# Patient Record
Sex: FEMALE | Race: BLACK OR AFRICAN AMERICAN | NOT HISPANIC OR LATINO | URBAN - METROPOLITAN AREA
[De-identification: names, ages, dates, MRNs, and addresses within clinical notes are randomized per-mention and may not be internally consistent; named-entity substitution may affect disease eponyms.]

---

## 2017-02-02 ENCOUNTER — INPATIENT (INPATIENT)
Facility: HOSPITAL | Age: 72
LOS: 3 days | Discharge: HOME | End: 2017-02-06
Attending: INTERNAL MEDICINE | Admitting: INTERNAL MEDICINE
Payer: MEDICARE

## 2017-02-02 VITALS
OXYGEN SATURATION: 99 % | DIASTOLIC BLOOD PRESSURE: 73 MMHG | SYSTOLIC BLOOD PRESSURE: 126 MMHG | TEMPERATURE: 98 F | HEART RATE: 88 BPM

## 2017-02-02 DIAGNOSIS — T14.8 OTHER INJURY OF UNSPECIFIED BODY REGION: ICD-10-CM

## 2017-02-02 DIAGNOSIS — I10 ESSENTIAL (PRIMARY) HYPERTENSION: ICD-10-CM

## 2017-02-02 DIAGNOSIS — E11.9 TYPE 2 DIABETES MELLITUS WITHOUT COMPLICATIONS: ICD-10-CM

## 2017-02-02 DIAGNOSIS — Z41.8 ENCOUNTER FOR OTHER PROCEDURES FOR PURPOSES OTHER THAN REMEDYING HEALTH STATE: ICD-10-CM

## 2017-02-02 DIAGNOSIS — E03.9 HYPOTHYROIDISM, UNSPECIFIED: ICD-10-CM

## 2017-02-02 DIAGNOSIS — R55 SYNCOPE AND COLLAPSE: ICD-10-CM

## 2017-02-02 DIAGNOSIS — Z90.710 ACQUIRED ABSENCE OF BOTH CERVIX AND UTERUS: Chronic | ICD-10-CM

## 2017-02-02 LAB
ALBUMIN SERPL ELPH-MCNC: 4.3 G/DL — SIGNIFICANT CHANGE UP (ref 3.3–5)
ALP SERPL-CCNC: 88 U/L — SIGNIFICANT CHANGE UP (ref 40–120)
ALT FLD-CCNC: 36 U/L — HIGH (ref 4–33)
AST SERPL-CCNC: 60 U/L — HIGH (ref 4–32)
BASOPHILS # BLD AUTO: 0.04 K/UL — SIGNIFICANT CHANGE UP (ref 0–0.2)
BASOPHILS NFR BLD AUTO: 0.4 % — SIGNIFICANT CHANGE UP (ref 0–2)
BILIRUB SERPL-MCNC: 0.6 MG/DL — SIGNIFICANT CHANGE UP (ref 0.2–1.2)
BUN SERPL-MCNC: 11 MG/DL — SIGNIFICANT CHANGE UP (ref 7–23)
BUN SERPL-MCNC: 9 MG/DL — SIGNIFICANT CHANGE UP (ref 7–23)
CALCIUM SERPL-MCNC: 9.2 MG/DL — SIGNIFICANT CHANGE UP (ref 8.4–10.5)
CALCIUM SERPL-MCNC: 9.5 MG/DL — SIGNIFICANT CHANGE UP (ref 8.4–10.5)
CHLORIDE SERPL-SCNC: 100 MMOL/L — SIGNIFICANT CHANGE UP (ref 98–107)
CHLORIDE SERPL-SCNC: 96 MMOL/L — LOW (ref 98–107)
CK MB BLD-MCNC: 2.05 NG/ML — SIGNIFICANT CHANGE UP (ref 1–4.7)
CK SERPL-CCNC: 200 U/L — HIGH (ref 25–170)
CO2 SERPL-SCNC: 21 MMOL/L — LOW (ref 22–31)
CO2 SERPL-SCNC: 22 MMOL/L — SIGNIFICANT CHANGE UP (ref 22–31)
CREAT SERPL-MCNC: 0.88 MG/DL — SIGNIFICANT CHANGE UP (ref 0.5–1.3)
CREAT SERPL-MCNC: 1.08 MG/DL — SIGNIFICANT CHANGE UP (ref 0.5–1.3)
EOSINOPHIL # BLD AUTO: 0.14 K/UL — SIGNIFICANT CHANGE UP (ref 0–0.5)
EOSINOPHIL NFR BLD AUTO: 1.5 % — SIGNIFICANT CHANGE UP (ref 0–6)
GLUCOSE SERPL-MCNC: 118 MG/DL — HIGH (ref 70–99)
GLUCOSE SERPL-MCNC: 129 MG/DL — HIGH (ref 70–99)
HCT VFR BLD CALC: 43.9 % — SIGNIFICANT CHANGE UP (ref 34.5–45)
HGB BLD-MCNC: 15 G/DL — SIGNIFICANT CHANGE UP (ref 11.5–15.5)
IMM GRANULOCYTES NFR BLD AUTO: 0.4 % — SIGNIFICANT CHANGE UP (ref 0–1.5)
LYMPHOCYTES # BLD AUTO: 2.09 K/UL — SIGNIFICANT CHANGE UP (ref 1–3.3)
LYMPHOCYTES # BLD AUTO: 22.7 % — SIGNIFICANT CHANGE UP (ref 13–44)
MCHC RBC-ENTMCNC: 29.5 PG — SIGNIFICANT CHANGE UP (ref 27–34)
MCHC RBC-ENTMCNC: 34.2 % — SIGNIFICANT CHANGE UP (ref 32–36)
MCV RBC AUTO: 86.4 FL — SIGNIFICANT CHANGE UP (ref 80–100)
MONOCYTES # BLD AUTO: 0.77 K/UL — SIGNIFICANT CHANGE UP (ref 0–0.9)
MONOCYTES NFR BLD AUTO: 8.4 % — SIGNIFICANT CHANGE UP (ref 2–14)
NEUTROPHILS # BLD AUTO: 6.13 K/UL — SIGNIFICANT CHANGE UP (ref 1.8–7.4)
NEUTROPHILS NFR BLD AUTO: 66.6 % — SIGNIFICANT CHANGE UP (ref 43–77)
PLATELET # BLD AUTO: 178 K/UL — SIGNIFICANT CHANGE UP (ref 150–400)
PMV BLD: 11.1 FL — SIGNIFICANT CHANGE UP (ref 7–13)
POTASSIUM SERPL-MCNC: 3.2 MMOL/L — LOW (ref 3.5–5.3)
POTASSIUM SERPL-MCNC: SIGNIFICANT CHANGE UP MMOL/L (ref 3.5–5.3)
POTASSIUM SERPL-SCNC: 3.2 MMOL/L — LOW (ref 3.5–5.3)
POTASSIUM SERPL-SCNC: SIGNIFICANT CHANGE UP MMOL/L (ref 3.5–5.3)
PROT SERPL-MCNC: 8.7 G/DL — HIGH (ref 6–8.3)
RBC # BLD: 5.08 M/UL — SIGNIFICANT CHANGE UP (ref 3.8–5.2)
RBC # FLD: 12.6 % — SIGNIFICANT CHANGE UP (ref 10.3–14.5)
SODIUM SERPL-SCNC: 133 MMOL/L — LOW (ref 135–145)
SODIUM SERPL-SCNC: 138 MMOL/L — SIGNIFICANT CHANGE UP (ref 135–145)
TROPONIN T SERPL-MCNC: < 0.06 NG/ML — SIGNIFICANT CHANGE UP (ref 0–0.06)
WBC # BLD: 9.21 K/UL — SIGNIFICANT CHANGE UP (ref 3.8–10.5)
WBC # FLD AUTO: 9.21 K/UL — SIGNIFICANT CHANGE UP (ref 3.8–10.5)

## 2017-02-02 PROCEDURE — 71010: CPT | Mod: 26

## 2017-02-02 PROCEDURE — 73610 X-RAY EXAM OF ANKLE: CPT | Mod: 26,50

## 2017-02-02 PROCEDURE — 73564 X-RAY EXAM KNEE 4 OR MORE: CPT | Mod: 26,RT

## 2017-02-02 RX ORDER — LEVOTHYROXINE SODIUM 125 MCG
75 TABLET ORAL DAILY
Qty: 0 | Refills: 0 | Status: DISCONTINUED | OUTPATIENT
Start: 2017-02-02 | End: 2017-02-06

## 2017-02-02 RX ORDER — AMLODIPINE BESYLATE 2.5 MG/1
5 TABLET ORAL DAILY
Qty: 0 | Refills: 0 | Status: DISCONTINUED | OUTPATIENT
Start: 2017-02-02 | End: 2017-02-06

## 2017-02-02 RX ORDER — ENOXAPARIN SODIUM 100 MG/ML
40 INJECTION SUBCUTANEOUS EVERY 24 HOURS
Qty: 0 | Refills: 0 | Status: DISCONTINUED | OUTPATIENT
Start: 2017-02-02 | End: 2017-02-06

## 2017-02-02 RX ORDER — ATORVASTATIN CALCIUM 80 MG/1
10 TABLET, FILM COATED ORAL AT BEDTIME
Qty: 0 | Refills: 0 | Status: DISCONTINUED | OUTPATIENT
Start: 2017-02-02 | End: 2017-02-02

## 2017-02-02 RX ORDER — LEVOTHYROXINE SODIUM 125 MCG
1 TABLET ORAL
Qty: 0 | Refills: 0 | COMMUNITY

## 2017-02-02 RX ORDER — AMLODIPINE BESYLATE 2.5 MG/1
1 TABLET ORAL
Qty: 0 | Refills: 0 | COMMUNITY

## 2017-02-02 RX ORDER — SODIUM CHLORIDE 9 MG/ML
1000 INJECTION INTRAMUSCULAR; INTRAVENOUS; SUBCUTANEOUS ONCE
Qty: 0 | Refills: 0 | Status: COMPLETED | OUTPATIENT
Start: 2017-02-02 | End: 2017-02-02

## 2017-02-02 RX ORDER — POTASSIUM CHLORIDE 20 MEQ
40 PACKET (EA) ORAL EVERY 4 HOURS
Qty: 0 | Refills: 0 | Status: COMPLETED | OUTPATIENT
Start: 2017-02-02 | End: 2017-02-03

## 2017-02-02 RX ADMIN — ENOXAPARIN SODIUM 40 MILLIGRAM(S): 100 INJECTION SUBCUTANEOUS at 22:15

## 2017-02-02 RX ADMIN — SODIUM CHLORIDE 1000 MILLILITER(S): 9 INJECTION INTRAMUSCULAR; INTRAVENOUS; SUBCUTANEOUS at 16:22

## 2017-02-02 RX ADMIN — Medication 40 MILLIEQUIVALENT(S): at 22:15

## 2017-02-02 NOTE — H&P ADULT. - ATTENDING COMMENTS
70 y/o female with PMHx of DM, HTN, hypothyroidism, pre-diabetes admitted for presyncope and syncope back to back while  she was walking down the street and  she felt hot and weak then fainted onto the sidewalk hitting her face mostly lip to the ground. Patient states she was helped up by bystanders on the street and she fainted another time. She was orthostatic. pt states that she had normal cardiac cath 3-4 years ago after they noticed some EKG changes.  Tele: No event, sinus  IVF  NS X 1L    ECHO  Carotid  Stress test

## 2017-02-02 NOTE — H&P ADULT. - NEGATIVE CARDIOVASCULAR SYMPTOMS
no dyspnea on exertion/no paroxysmal nocturnal dyspnea/no peripheral edema/no chest pain/no orthopnea/no palpitations

## 2017-02-02 NOTE — H&P ADULT. - MUSCULOSKELETAL
details… detailed exam tenderness to palpation of right knee and right ankle with inversion/eversion/normal/ROM intact/no joint swelling

## 2017-02-02 NOTE — ED PROVIDER NOTE - ATTENDING CONTRIBUTION TO CARE
I performed a history and physical exam of the patient and discussed their management with the resident. I reviewed the resident's note and agree with the documented findings and plan of care. My medical decision making and observations are found above.  No focal neuro changes. Trauma to chin from fall.

## 2017-02-02 NOTE — ED PROVIDER NOTE - OBJECTIVE STATEMENT
72 y/o F w/ Hx DM, HTN, HLD pf syncope.  Pt was walking, felt hot, felt like she was going to pass out, fell to ground, helped up, s/p syncope, does not remember falling second time, hit face on ground.  Notes pain to R knee.  Denies CP, SOB, n/v, AP, d/c, cough, or recent illness.  tdap UTD

## 2017-02-02 NOTE — H&P ADULT. - PROBLEM SELECTOR PLAN 1
Admit to telemetry for continuous cardiac monitoring.  Orthostatics   Trend CE  Serial EKGs  TTE to evaluate LVSF  Fall precautions  PT evaluation  CBC, BMP

## 2017-02-02 NOTE — H&P ADULT. - HISTORY OF PRESENT ILLNESS
72 y/o female with PMHx of DM, HTN, hypothyroidism, pre-diabetes p/w syncope. Patient states she was walking down the block today when she felt hot and weak and fainted onto the sidewalk hitting her face to the ground. Patient states she was helped up by people on the street and she fainted another time. Patient states she does not recall fainting the second time. Patient has right knee and ankle pain after the fall. Patient had laceration to lower lip sutured by the ER.  Patient denies any CP, confusion, dizziness, previous episode of syncope, SOB, HA, N/V/D,     Patient stated in 2012 she was sent from cardiologist office for EKG changes and has angiogram that was normal. Patient does not have a cardiologist. EKG showed 72 y/o female with PMHx of DM, HTN, hypothyroidism, pre-diabetes p/w syncope. Patient states she was walking down the block today when she felt hot and weak and fainted onto the sidewalk hitting her face to the ground. Patient states she was helped up by people on the street and she fainted another time. Patient states she does not recall fainting the second time. Patient has right knee and ankle pain after the fall. Patient had laceration to lower lip sutured by the ER.  Patient denies any CP, confusion, dizziness, previous episode of syncope, SOB, HA, N/V/D, abdominal pain. Patient denies cardiac workup in past and does not have a cardiologist.     Patient stated in 2012 she was sent from cardiologist office for EKG changes and has angiogram that was normal. Patient does not have a cardiologist. EKG showed NSR with TWI II, III, aVL, V1-V5. 70 y/o female with PMHx of DM, HTN, hypothyroidism, pre-diabetes p/w syncope. Patient states she was walking down the block today when she felt hot and weak then fainted onto the sidewalk hitting her face to the ground. Patient states she was helped up by bystanders on the street and she fainted another time. Patient states she does not recall fainting the second time. Patient has right knee and ankle pain after the fall. Xray of Knee shows no fracture, dislocation, or joint effusion. At baseline patient ambulates without assistance and is able to perform ADL's independently.     Patient stated in 2012 she was sent from cardiologist office for EKG changes and has angiogram that was normal. Patient does not have a cardiologist. EKG showed NSR with TWI II, III, aVL, V1-V5.   Patient had laceration to lower lip sutured by the ER.  At present patient is asymptomatic, denies any CP, palpitations, SOB, N/V/D, dizziness, weakness, recent illness, PND, lower extremity edema, PND, LEOS, previous episode of syncope. 72 y/o female with PMHx of DM, HTN, hypothyroidism, pre-diabetes p/w syncope. Patient states she was walking down the block today when she felt hot and weak then fainted onto the sidewalk hitting her face to the ground. Patient states she was helped up by bystanders on the street and she fainted another time. Patient states she does not recall fainting the second time. Patient has right knee and ankle pain after the fall. Xray of Knee shows no fracture, dislocation, or joint effusion. At baseline patient ambulates without assistance and is able to perform ADL's independently.     Patient stated in 2012 she was sent from cardiologist office for EKG changes and has angiogram that was normal. Patient does not have a cardiologist. EKG showed NSR with TWI II, III, aVL, V1-V5.   Patient had 4 cm laceration to lower lip sutured by the ER with 3 chromic sutures.  At present patient is asymptomatic, denies any CP, palpitations, SOB, N/V/D, dizziness, weakness, recent illness, PND, lower extremity edema, PND, LEOS, previous episode of syncope.

## 2017-02-02 NOTE — ED ADULT NURSE NOTE - OBJECTIVE STATEMENT
Facilitator: Receive pt in spot 4 s/p syncope/fall after feeling warmed and flushed.  Denies chest pain, sob, dizziness.,  Pt with bruising to lower gum area.  Bleeding controlled/  Pt with R knee pain and mild swelling.  IV placed.  Labs sent.  VS see charting.  Pt sinus melany 57 on cardiac monitoring.  Skin intact

## 2017-02-02 NOTE — H&P ADULT. - ASSESSMENT
72 y/o female with PMHx of DM, HTN, hypothyroidism, pre-diabetes p/w syncope. Admit to telemetry for continuous cardiac monitoring.

## 2017-02-02 NOTE — ED ADULT TRIAGE NOTE - CHIEF COMPLAINT QUOTE
Pt c/o feeling dizzy and fell states got up and fell again. Pt denies any chest pain states hit the left side of her face no bruising  noted.

## 2017-02-02 NOTE — ED PROVIDER NOTE - MEDICAL DECISION MAKING DETAILS
Viktoria: pt with lightheadedness leading to syncope. No obvious cause by hx. Hx of htn no known cardiac dz. Will look for causes.

## 2017-02-02 NOTE — PATIENT PROFILE ADULT. - TEACHING/LEARNING LEARNING PREFERENCES
group instruction/written material/skill demonstration/verbal instruction/pictorial/video/computer/internet/audio/individual instruction

## 2017-02-02 NOTE — H&P ADULT. - RS GEN PE MLT RESP DETAILS PC
no rales/clear to auscultation bilaterally/airway patent/no wheezes/good air movement/no rhonchi/normal/breath sounds equal

## 2017-02-02 NOTE — H&P ADULT. - NEUROLOGICAL DETAILS
responds to pain/alert and oriented x 3/normal strength/cranial nerves intact/responds to verbal commands/sensation intact

## 2017-02-03 LAB
BUN SERPL-MCNC: 10 MG/DL — SIGNIFICANT CHANGE UP (ref 7–23)
CALCIUM SERPL-MCNC: 9.2 MG/DL — SIGNIFICANT CHANGE UP (ref 8.4–10.5)
CHLORIDE SERPL-SCNC: 102 MMOL/L — SIGNIFICANT CHANGE UP (ref 98–107)
CHOLEST SERPL-MCNC: 150 MG/DL — SIGNIFICANT CHANGE UP (ref 120–199)
CK MB BLD-MCNC: 1 — SIGNIFICANT CHANGE UP (ref 0–2.5)
CK MB BLD-MCNC: 1.67 NG/ML — SIGNIFICANT CHANGE UP (ref 1–4.7)
CK MB BLD-MCNC: 2.17 NG/ML — SIGNIFICANT CHANGE UP (ref 1–4.7)
CK SERPL-CCNC: 171 U/L — HIGH (ref 25–170)
CK SERPL-CCNC: 175 U/L — HIGH (ref 25–170)
CO2 SERPL-SCNC: 18 MMOL/L — LOW (ref 22–31)
CREAT SERPL-MCNC: 0.8 MG/DL — SIGNIFICANT CHANGE UP (ref 0.5–1.3)
GLUCOSE SERPL-MCNC: 102 MG/DL — HIGH (ref 70–99)
HBA1C BLD-MCNC: 6.1 % — HIGH (ref 4–5.6)
HCT VFR BLD CALC: 43.3 % — SIGNIFICANT CHANGE UP (ref 34.5–45)
HDLC SERPL-MCNC: 47 MG/DL — SIGNIFICANT CHANGE UP (ref 45–65)
HGB BLD-MCNC: 14.9 G/DL — SIGNIFICANT CHANGE UP (ref 11.5–15.5)
LIPID PNL WITH DIRECT LDL SERPL: 94 MG/DL — SIGNIFICANT CHANGE UP
MAGNESIUM SERPL-MCNC: 2.2 MG/DL — SIGNIFICANT CHANGE UP (ref 1.6–2.6)
MCHC RBC-ENTMCNC: 30.2 PG — SIGNIFICANT CHANGE UP (ref 27–34)
MCHC RBC-ENTMCNC: 34.4 % — SIGNIFICANT CHANGE UP (ref 32–36)
MCV RBC AUTO: 87.7 FL — SIGNIFICANT CHANGE UP (ref 80–100)
PHOSPHATE SERPL-MCNC: 3.5 MG/DL — SIGNIFICANT CHANGE UP (ref 2.5–4.5)
PLATELET # BLD AUTO: 215 K/UL — SIGNIFICANT CHANGE UP (ref 150–400)
PMV BLD: 10.8 FL — SIGNIFICANT CHANGE UP (ref 7–13)
POTASSIUM SERPL-MCNC: 4.4 MMOL/L — SIGNIFICANT CHANGE UP (ref 3.5–5.3)
POTASSIUM SERPL-SCNC: 4.4 MMOL/L — SIGNIFICANT CHANGE UP (ref 3.5–5.3)
RBC # BLD: 4.94 M/UL — SIGNIFICANT CHANGE UP (ref 3.8–5.2)
RBC # FLD: 12.7 % — SIGNIFICANT CHANGE UP (ref 10.3–14.5)
SODIUM SERPL-SCNC: 140 MMOL/L — SIGNIFICANT CHANGE UP (ref 135–145)
TRIGL SERPL-MCNC: 83 MG/DL — SIGNIFICANT CHANGE UP (ref 10–149)
TROPONIN T SERPL-MCNC: < 0.06 NG/ML — SIGNIFICANT CHANGE UP (ref 0–0.06)
TROPONIN T SERPL-MCNC: < 0.06 NG/ML — SIGNIFICANT CHANGE UP (ref 0–0.06)
TSH SERPL-MCNC: 2.22 UIU/ML — SIGNIFICANT CHANGE UP (ref 0.27–4.2)
WBC # BLD: 15.2 K/UL — HIGH (ref 3.8–10.5)
WBC # FLD AUTO: 15.2 K/UL — HIGH (ref 3.8–10.5)

## 2017-02-03 PROCEDURE — 93880 EXTRACRANIAL BILAT STUDY: CPT | Mod: 26

## 2017-02-03 RX ORDER — METOPROLOL TARTRATE 50 MG
100 TABLET ORAL DAILY
Qty: 0 | Refills: 0 | Status: DISCONTINUED | OUTPATIENT
Start: 2017-02-03 | End: 2017-02-06

## 2017-02-03 RX ORDER — SODIUM CHLORIDE 9 MG/ML
1000 INJECTION INTRAMUSCULAR; INTRAVENOUS; SUBCUTANEOUS
Qty: 0 | Refills: 0 | Status: COMPLETED | OUTPATIENT
Start: 2017-02-03 | End: 2017-02-03

## 2017-02-03 RX ADMIN — AMLODIPINE BESYLATE 5 MILLIGRAM(S): 2.5 TABLET ORAL at 05:19

## 2017-02-03 RX ADMIN — SODIUM CHLORIDE 60 MILLILITER(S): 9 INJECTION INTRAMUSCULAR; INTRAVENOUS; SUBCUTANEOUS at 06:00

## 2017-02-03 RX ADMIN — Medication 1 TABLET(S): at 10:00

## 2017-02-03 RX ADMIN — Medication 40 MILLIEQUIVALENT(S): at 05:18

## 2017-02-03 RX ADMIN — Medication 75 MICROGRAM(S): at 05:19

## 2017-02-03 RX ADMIN — Medication 100 MILLIGRAM(S): at 10:00

## 2017-02-03 RX ADMIN — ENOXAPARIN SODIUM 40 MILLIGRAM(S): 100 INJECTION SUBCUTANEOUS at 21:19

## 2017-02-03 NOTE — PHYSICAL THERAPY INITIAL EVALUATION ADULT - GENERAL OBSERVATIONS, REHAB EVAL
Patient received semi supine in bed, (+) tele monitor , (+) IV ,/79 HR 72 SpO2 100% after activities, no c/o dizziness,only c/o pain on (R) ankle with wt bearing activities with relief at rest ,pt did not take any pain medications, RN present and is notified.

## 2017-02-03 NOTE — PHYSICAL THERAPY INITIAL EVALUATION ADULT - PERTINENT HX OF CURRENT PROBLEM, REHAB EVAL
This is a 70 y/o female with PMHx of DM, HTN, hypothyroidism, pre-diabetes admitted for presyncope and syncope back to back while  she was walking down the street and  she felt hot and weak then fainted onto the sidewalk hitting her face mostly lip to the ground.

## 2017-02-03 NOTE — PHYSICAL THERAPY INITIAL EVALUATION ADULT - ACTIVE RANGE OF MOTION EXAMINATION, REHAB EVAL
(R) ankle movts with pain/bilateral  lower extremity Active ROM was WFL (within functional limits)/bilateral upper extremity Active ROM was WFL (within functional limits)

## 2017-02-04 LAB
BUN SERPL-MCNC: 14 MG/DL — SIGNIFICANT CHANGE UP (ref 7–23)
CALCIUM SERPL-MCNC: 9.2 MG/DL — SIGNIFICANT CHANGE UP (ref 8.4–10.5)
CHLORIDE SERPL-SCNC: 102 MMOL/L — SIGNIFICANT CHANGE UP (ref 98–107)
CO2 SERPL-SCNC: 21 MMOL/L — LOW (ref 22–31)
CREAT SERPL-MCNC: 0.73 MG/DL — SIGNIFICANT CHANGE UP (ref 0.5–1.3)
GLUCOSE SERPL-MCNC: 92 MG/DL — SIGNIFICANT CHANGE UP (ref 70–99)
HCT VFR BLD CALC: 38.1 % — SIGNIFICANT CHANGE UP (ref 34.5–45)
HGB BLD-MCNC: 13 G/DL — SIGNIFICANT CHANGE UP (ref 11.5–15.5)
MAGNESIUM SERPL-MCNC: 2.1 MG/DL — SIGNIFICANT CHANGE UP (ref 1.6–2.6)
MCHC RBC-ENTMCNC: 29.7 PG — SIGNIFICANT CHANGE UP (ref 27–34)
MCHC RBC-ENTMCNC: 34.1 % — SIGNIFICANT CHANGE UP (ref 32–36)
MCV RBC AUTO: 87 FL — SIGNIFICANT CHANGE UP (ref 80–100)
PLATELET # BLD AUTO: 206 K/UL — SIGNIFICANT CHANGE UP (ref 150–400)
PMV BLD: 10.9 FL — SIGNIFICANT CHANGE UP (ref 7–13)
POTASSIUM SERPL-MCNC: 3.4 MMOL/L — LOW (ref 3.5–5.3)
POTASSIUM SERPL-SCNC: 3.4 MMOL/L — LOW (ref 3.5–5.3)
RBC # BLD: 4.38 M/UL — SIGNIFICANT CHANGE UP (ref 3.8–5.2)
RBC # FLD: 12.6 % — SIGNIFICANT CHANGE UP (ref 10.3–14.5)
SODIUM SERPL-SCNC: 136 MMOL/L — SIGNIFICANT CHANGE UP (ref 135–145)
WBC # BLD: 8.7 K/UL — SIGNIFICANT CHANGE UP (ref 3.8–10.5)
WBC # FLD AUTO: 8.7 K/UL — SIGNIFICANT CHANGE UP (ref 3.8–10.5)

## 2017-02-04 RX ORDER — POTASSIUM CHLORIDE 20 MEQ
40 PACKET (EA) ORAL ONCE
Qty: 0 | Refills: 0 | Status: COMPLETED | OUTPATIENT
Start: 2017-02-04 | End: 2017-02-04

## 2017-02-04 RX ADMIN — Medication 100 MILLIGRAM(S): at 06:18

## 2017-02-04 RX ADMIN — Medication 75 MICROGRAM(S): at 06:18

## 2017-02-04 RX ADMIN — Medication 40 MILLIEQUIVALENT(S): at 09:26

## 2017-02-04 RX ADMIN — AMLODIPINE BESYLATE 5 MILLIGRAM(S): 2.5 TABLET ORAL at 06:18

## 2017-02-04 RX ADMIN — ENOXAPARIN SODIUM 40 MILLIGRAM(S): 100 INJECTION SUBCUTANEOUS at 21:15

## 2017-02-04 RX ADMIN — Medication 1 TABLET(S): at 11:21

## 2017-02-05 LAB
ALBUMIN SERPL ELPH-MCNC: 3.6 G/DL — SIGNIFICANT CHANGE UP (ref 3.3–5)
ALP SERPL-CCNC: 71 U/L — SIGNIFICANT CHANGE UP (ref 40–120)
ALT FLD-CCNC: 25 U/L — SIGNIFICANT CHANGE UP (ref 4–33)
AST SERPL-CCNC: 26 U/L — SIGNIFICANT CHANGE UP (ref 4–32)
BILIRUB DIRECT SERPL-MCNC: 0.2 MG/DL — SIGNIFICANT CHANGE UP (ref 0.1–0.2)
BILIRUB SERPL-MCNC: 0.8 MG/DL — SIGNIFICANT CHANGE UP (ref 0.2–1.2)
BUN SERPL-MCNC: 13 MG/DL — SIGNIFICANT CHANGE UP (ref 7–23)
CALCIUM SERPL-MCNC: 9.1 MG/DL — SIGNIFICANT CHANGE UP (ref 8.4–10.5)
CHLORIDE SERPL-SCNC: 99 MMOL/L — SIGNIFICANT CHANGE UP (ref 98–107)
CO2 SERPL-SCNC: 22 MMOL/L — SIGNIFICANT CHANGE UP (ref 22–31)
CREAT SERPL-MCNC: 0.72 MG/DL — SIGNIFICANT CHANGE UP (ref 0.5–1.3)
GLUCOSE SERPL-MCNC: 97 MG/DL — SIGNIFICANT CHANGE UP (ref 70–99)
HCT VFR BLD CALC: 38 % — SIGNIFICANT CHANGE UP (ref 34.5–45)
HGB BLD-MCNC: 13 G/DL — SIGNIFICANT CHANGE UP (ref 11.5–15.5)
MAGNESIUM SERPL-MCNC: 2.1 MG/DL — SIGNIFICANT CHANGE UP (ref 1.6–2.6)
MCHC RBC-ENTMCNC: 29.5 PG — SIGNIFICANT CHANGE UP (ref 27–34)
MCHC RBC-ENTMCNC: 34.2 % — SIGNIFICANT CHANGE UP (ref 32–36)
MCV RBC AUTO: 86.4 FL — SIGNIFICANT CHANGE UP (ref 80–100)
PLATELET # BLD AUTO: 221 K/UL — SIGNIFICANT CHANGE UP (ref 150–400)
PMV BLD: 10.8 FL — SIGNIFICANT CHANGE UP (ref 7–13)
POTASSIUM SERPL-MCNC: 3.9 MMOL/L — SIGNIFICANT CHANGE UP (ref 3.5–5.3)
POTASSIUM SERPL-SCNC: 3.9 MMOL/L — SIGNIFICANT CHANGE UP (ref 3.5–5.3)
PROT SERPL-MCNC: 7 G/DL — SIGNIFICANT CHANGE UP (ref 6–8.3)
RBC # BLD: 4.4 M/UL — SIGNIFICANT CHANGE UP (ref 3.8–5.2)
RBC # FLD: 12.6 % — SIGNIFICANT CHANGE UP (ref 10.3–14.5)
SODIUM SERPL-SCNC: 135 MMOL/L — SIGNIFICANT CHANGE UP (ref 135–145)
WBC # BLD: 8.98 K/UL — SIGNIFICANT CHANGE UP (ref 3.8–10.5)
WBC # FLD AUTO: 8.98 K/UL — SIGNIFICANT CHANGE UP (ref 3.8–10.5)

## 2017-02-05 PROCEDURE — 93018 CV STRESS TEST I&R ONLY: CPT | Mod: GC

## 2017-02-05 PROCEDURE — 93016 CV STRESS TEST SUPVJ ONLY: CPT | Mod: GC

## 2017-02-05 PROCEDURE — 78452 HT MUSCLE IMAGE SPECT MULT: CPT | Mod: 26

## 2017-02-05 PROCEDURE — 93306 TTE W/DOPPLER COMPLETE: CPT | Mod: 26

## 2017-02-05 RX ORDER — ASPIRIN/CALCIUM CARB/MAGNESIUM 324 MG
81 TABLET ORAL DAILY
Qty: 0 | Refills: 0 | Status: DISCONTINUED | OUTPATIENT
Start: 2017-02-05 | End: 2017-02-06

## 2017-02-05 RX ADMIN — Medication 75 MICROGRAM(S): at 05:36

## 2017-02-05 RX ADMIN — Medication 81 MILLIGRAM(S): at 17:06

## 2017-02-05 RX ADMIN — Medication 100 MILLIGRAM(S): at 05:36

## 2017-02-05 RX ADMIN — AMLODIPINE BESYLATE 5 MILLIGRAM(S): 2.5 TABLET ORAL at 05:36

## 2017-02-05 RX ADMIN — ENOXAPARIN SODIUM 40 MILLIGRAM(S): 100 INJECTION SUBCUTANEOUS at 23:06

## 2017-02-05 RX ADMIN — Medication 1 TABLET(S): at 13:31

## 2017-02-05 NOTE — DISCHARGE NOTE ADULT - MEDICATION SUMMARY - MEDICATIONS TO CHANGE
I will SWITCH the dose or number of times a day I take the medications listed below when I get home from the hospital:    atorvastatin 10 mg oral tablet  -- 1 tab(s) by mouth once a day

## 2017-02-05 NOTE — DISCHARGE NOTE ADULT - HOSPITAL COURSE
70 y/o female with PMHx of DM, HTN, hypothyroidism, pre-diabetes p/w syncope. Patient states she was walking down the block today when she felt hot and weak then fainted onto the sidewalk hitting her face to the ground. Patient states she was helped up by bystanders on the street and she fainted another time. Patient states she does not recall fainting the second time. Patient has right knee and ankle pain after the fall. Xray of Knee shows no fracture, dislocation, or joint effusion. At baseline patient ambulates without assistance and is able to perform ADL's independently.     Patient stated in 2012 she was sent from cardiologist office for EKG changes and has angiogram that was normal. Patient does not have a cardiologist. EKG showed NSR with TWI II, III, aVL, V1-V5.   Patient had 4 cm laceration to lower lip sutured by the ER with 3 chromic sutures.  At present patient is asymptomatic, denies any CP, palpitations, SOB, N/V/D, dizziness, weakness, recent illness, PND, lower extremity edema, PND, LEOS, previous episode of syncope.   70 y/o female with PMHx of DM, HTN, hypothyroidism, pre-diabetes p/w syncope. Patient states she was walking down the block today when she felt hot and weak then fainted onto the sidewalk hitting her face to the ground. Patient states she was helped up by bystanders on the street and she fainted another time. Patient states she does not recall fainting the second time. Patient has right knee and ankle pain after the fall. Xray of Knee shows no fracture, dislocation, or joint effusion. At baseline patient ambulates without assistance and is able to perform ADL's independently.     Xray Knee: No fracture, dislocation, or joint effusion.Preserved joint spaces with smooth and intact articular surfaces.Small superior patellar enthesophyte otherwise unremarkable quadriceps and patellar tendon shadows. Generalized osteopenia otherwise no discrete lytic or blastic lesions.   Chest Xray:Slight right hemidiaphragm elevation.Small focus of linear subsegmental atelectasis or scar peripheral left midlung region. Clear remaining visualized lungs. No pleural effusions or pneumothorax.Cardiac and mediastinal silhouettes appear prominent but inaccurately assessed on this projection. Trachea midline.Unremarkable osseous structures.  EKG: NSR with TWI II, III, aVL, V1-V5.   CE: Ck 200, 171 Troponin <0.06 x 2  ankle xray:No acute fracture or dislocation of the left or right ankle. The joint spaces and ankle mortises are preserved. No ankle joint effusions or abnormal soft tissue swelling.  Cardio: IVF NS X 1L, ECHO, Carotid, Stress test.   cd: Minimal heterogenous plaque noted within the proximal  right and left internal carotid arteries, consistent with  1-15% stenoses.  No hemodynamically significant stenoses  noted. 70 y/o female with PMHx of DM, HTN, hypothyroidism, pre-diabetes p/w syncope. Patient states she was walking down the block today when she felt hot and weak then fainted onto the sidewalk hitting her face to the ground. Patient states she was helped up by bystanders on the street and she fainted another time. Patient states she does not recall fainting the second time. Patient has right knee and ankle pain after the fall. Xray of Knee shows no fracture, dislocation, or joint effusion. At baseline patient ambulates without assistance and is able to perform ADL's independently.     Patient stated in 2012 she was sent from cardiologist office for EKG changes and has angiogram that was normal. Patient does not have a cardiologist. EKG showed NSR with TWI II, III, aVL, V1-V5.   Patient had 4 cm laceration to lower lip sutured by the ER with 3 chromic sutures.  At present patient is asymptomatic, denies any CP, palpitations, SOB, N/V/D, dizziness, weakness, recent illness, PND, lower extremity edema, PND, LEOS, previous episode of syncope.   70 y/o female with PMHx of DM, HTN, hypothyroidism, pre-diabetes p/w syncope. Patient states she was walking down the block today when she felt hot and weak then fainted onto the sidewalk hitting her face to the ground. Patient states she was helped up by bystanders on the street and she fainted another time. Patient states she does not recall fainting the second time. Patient has right knee and ankle pain after the fall. Xray of Knee shows no fracture, dislocation, or joint effusion. At baseline patient ambulates without assistance and is able to perform ADL's independently.     Xray Knee: No fracture, dislocation, or joint effusion.Preserved joint spaces with smooth and intact articular surfaces.Small superior patellar enthesophyte otherwise unremarkable quadriceps and patellar tendon shadows. Generalized osteopenia otherwise no discrete lytic or blastic lesions.   Chest Xray:Slight right hemidiaphragm elevation.Small focus of linear subsegmental atelectasis or scar peripheral left midlung region. Clear remaining visualized lungs. No pleural effusions or pneumothorax.Cardiac and mediastinal silhouettes appear prominent but inaccurately assessed on this projection. Trachea midline.Unremarkable osseous structures.  EKG: NSR with TWI II, III, aVL, V1-V5.   CE: Ck 200, 171 Troponin <0.06 x 2  ankle xray:No acute fracture or dislocation of the left or right ankle. The joint spaces and ankle mortises are preserved. No ankle joint effusions or abnormal soft tissue swelling.  Cardio: IVF NS X 1L, ECHO, Carotid, Stress test.   cd: Minimal heterogenous plaque noted within the proximal  right and left internal carotid arteries, consistent with  1-15% stenoses.  No hemodynamically significant stenoses  2/6 LHC: LAD 30%, RCA 20%, LVEDP 12, LRA accessed  noted.    Pt with nonobstructive disease and will discharge on current meds, increase statin to 20 mg.

## 2017-02-05 NOTE — DISCHARGE NOTE ADULT - CARE PLAN
Principal Discharge DX:	Syncope  Goal:	prevent reoccurrence  Instructions for follow-up, activity and diet:	Maintain yourself well hydrated, follow up with Dr. Yu for further workup and possible need for loop recorder  Secondary Diagnosis:	HTN (hypertension)  Instructions for follow-up, activity and diet:	low salt diet, continue antihypertensive medication  Secondary Diagnosis:	Hypothyroidism  Instructions for follow-up, activity and diet:	continue synthroid, thyroid function test in 4-6 weeks  Secondary Diagnosis:	Prediabetes  Instructions for follow-up, activity and diet:	You are prediabetic, monitor your diet adhere with a consistent carbohydrate diet, your A1C 6.1 Principal Discharge DX:	Syncope  Goal:	prevent reoccurrence  Instructions for follow-up, activity and diet:	Maintain yourself well hydrated, follow up with Dr. Yu for further workup and possible need for loop recorder  Secondary Diagnosis:	HTN (hypertension)  Goal:	maintain normal blood pressure  Instructions for follow-up, activity and diet:	low salt diet, continue antihypertensive medication  Secondary Diagnosis:	Hypothyroidism  Goal:	maintain normal thyroid function  Instructions for follow-up, activity and diet:	continue synthroid, thyroid function test in 4-6 weeks  Secondary Diagnosis:	Prediabetes  Instructions for follow-up, activity and diet:	You are prediabetic, monitor your diet adhere with a consistent carbohydrate diet, your A1C 6.1 Principal Discharge DX:	Syncope  Goal:	prevent reoccurrence  Instructions for follow-up, activity and diet:	Maintain yourself well hydrated, follow up with Dr. Yu for further workup and possible need for loop recorder  Secondary Diagnosis:	HTN (hypertension)  Goal:	maintain normal blood pressure  Instructions for follow-up, activity and diet:	low salt diet, continue antihypertensive medication  Secondary Diagnosis:	Hypothyroidism  Goal:	maintain normal thyroid function  Instructions for follow-up, activity and diet:	continue synthroid, thyroid function test in 4-6 weeks  Secondary Diagnosis:	Prediabetes  Goal:	maintain normal HgA1  Instructions for follow-up, activity and diet:	You are prediabetic, monitor your diet adhere with a consistent carbohydrate diet, your A1C 6.1

## 2017-02-05 NOTE — DISCHARGE NOTE ADULT - PATIENT PORTAL LINK FT
“You can access the [facility] Patient Portal, offered by [facility], by registering with the following website: [email or web address]”

## 2017-02-05 NOTE — DISCHARGE NOTE ADULT - PLAN OF CARE
You are prediabetic, monitor your diet adhere with a consistent carbohydrate diet, your A1C 6.1 continue synthroid, thyroid function test in 4-6 weeks low salt diet, continue antihypertensive medication prevent reoccurrence Maintain yourself well hydrated, follow up with Dr. Yu for further workup and possible need for loop recorder maintain normal blood pressure maintain normal thyroid function maintain normal HgA1

## 2017-02-05 NOTE — DISCHARGE NOTE ADULT - CARE PROVIDER_API CALL
Hamilton Yu (MD), Internal Medicine  [address] [place] [id number]  Phone: [phone]  Fax: [phone]    Dr. Garcia,   PCP  Phone: (   )    -  Fax: (   )    -

## 2017-02-06 VITALS
SYSTOLIC BLOOD PRESSURE: 123 MMHG | TEMPERATURE: 98 F | RESPIRATION RATE: 18 BRPM | OXYGEN SATURATION: 99 % | DIASTOLIC BLOOD PRESSURE: 84 MMHG | HEART RATE: 71 BPM

## 2017-02-06 LAB
BUN SERPL-MCNC: 12 MG/DL — SIGNIFICANT CHANGE UP (ref 7–23)
CALCIUM SERPL-MCNC: 8.8 MG/DL — SIGNIFICANT CHANGE UP (ref 8.4–10.5)
CHLORIDE SERPL-SCNC: 101 MMOL/L — SIGNIFICANT CHANGE UP (ref 98–107)
CO2 SERPL-SCNC: 23 MMOL/L — SIGNIFICANT CHANGE UP (ref 22–31)
CREAT SERPL-MCNC: 0.73 MG/DL — SIGNIFICANT CHANGE UP (ref 0.5–1.3)
GLUCOSE SERPL-MCNC: 103 MG/DL — HIGH (ref 70–99)
HCT VFR BLD CALC: 37.5 % — SIGNIFICANT CHANGE UP (ref 34.5–45)
HGB BLD-MCNC: 12.9 G/DL — SIGNIFICANT CHANGE UP (ref 11.5–15.5)
MAGNESIUM SERPL-MCNC: 2.1 MG/DL — SIGNIFICANT CHANGE UP (ref 1.6–2.6)
MCHC RBC-ENTMCNC: 29.7 PG — SIGNIFICANT CHANGE UP (ref 27–34)
MCHC RBC-ENTMCNC: 34.4 % — SIGNIFICANT CHANGE UP (ref 32–36)
MCV RBC AUTO: 86.2 FL — SIGNIFICANT CHANGE UP (ref 80–100)
PLATELET # BLD AUTO: 217 K/UL — SIGNIFICANT CHANGE UP (ref 150–400)
PMV BLD: 10.6 FL — SIGNIFICANT CHANGE UP (ref 7–13)
POTASSIUM SERPL-MCNC: 3.5 MMOL/L — SIGNIFICANT CHANGE UP (ref 3.5–5.3)
POTASSIUM SERPL-SCNC: 3.5 MMOL/L — SIGNIFICANT CHANGE UP (ref 3.5–5.3)
RBC # BLD: 4.35 M/UL — SIGNIFICANT CHANGE UP (ref 3.8–5.2)
RBC # FLD: 12.5 % — SIGNIFICANT CHANGE UP (ref 10.3–14.5)
SODIUM SERPL-SCNC: 136 MMOL/L — SIGNIFICANT CHANGE UP (ref 135–145)
WBC # BLD: 7.66 K/UL — SIGNIFICANT CHANGE UP (ref 3.8–10.5)
WBC # FLD AUTO: 7.66 K/UL — SIGNIFICANT CHANGE UP (ref 3.8–10.5)

## 2017-02-06 RX ORDER — METOPROLOL TARTRATE 50 MG
1 TABLET ORAL
Qty: 0 | Refills: 0 | COMMUNITY

## 2017-02-06 RX ORDER — ASPIRIN/CALCIUM CARB/MAGNESIUM 324 MG
1 TABLET ORAL
Qty: 0 | Refills: 0 | COMMUNITY
Start: 2017-02-06

## 2017-02-06 RX ORDER — ATORVASTATIN CALCIUM 80 MG/1
20 TABLET, FILM COATED ORAL AT BEDTIME
Qty: 0 | Refills: 0 | Status: DISCONTINUED | OUTPATIENT
Start: 2017-02-06 | End: 2017-02-06

## 2017-02-06 RX ORDER — ATORVASTATIN CALCIUM 80 MG/1
1 TABLET, FILM COATED ORAL
Qty: 0 | Refills: 0 | COMMUNITY

## 2017-02-06 RX ORDER — METOPROLOL TARTRATE 50 MG
1 TABLET ORAL
Qty: 0 | Refills: 0 | COMMUNITY
Start: 2017-02-06

## 2017-02-06 RX ORDER — ATORVASTATIN CALCIUM 80 MG/1
1 TABLET, FILM COATED ORAL
Qty: 30 | Refills: 0 | OUTPATIENT
Start: 2017-02-06 | End: 2017-03-08

## 2017-02-06 RX ORDER — ATORVASTATIN CALCIUM 80 MG/1
1 TABLET, FILM COATED ORAL
Qty: 0 | Refills: 0 | COMMUNITY
Start: 2017-02-06

## 2017-02-06 RX ORDER — SODIUM CHLORIDE 9 MG/ML
500 INJECTION INTRAMUSCULAR; INTRAVENOUS; SUBCUTANEOUS
Qty: 0 | Refills: 0 | Status: DISCONTINUED | OUTPATIENT
Start: 2017-02-06 | End: 2017-02-06

## 2017-02-06 RX ADMIN — Medication 1 TABLET(S): at 12:01

## 2017-02-06 RX ADMIN — Medication 81 MILLIGRAM(S): at 07:44

## 2017-02-06 RX ADMIN — AMLODIPINE BESYLATE 5 MILLIGRAM(S): 2.5 TABLET ORAL at 06:08

## 2017-02-06 RX ADMIN — Medication 100 MILLIGRAM(S): at 06:08

## 2017-02-06 RX ADMIN — Medication 75 MICROGRAM(S): at 06:08

## 2017-02-06 RX ADMIN — SODIUM CHLORIDE 100 MILLILITER(S): 9 INJECTION INTRAMUSCULAR; INTRAVENOUS; SUBCUTANEOUS at 10:44

## 2019-03-20 ENCOUNTER — EMERGENCY (EMERGENCY)
Facility: HOSPITAL | Age: 74
LOS: 1 days | Discharge: HOME | End: 2019-03-20
Attending: EMERGENCY MEDICINE | Admitting: EMERGENCY MEDICINE
Payer: MEDICARE

## 2019-03-20 VITALS
DIASTOLIC BLOOD PRESSURE: 68 MMHG | RESPIRATION RATE: 18 BRPM | SYSTOLIC BLOOD PRESSURE: 150 MMHG | OXYGEN SATURATION: 100 % | TEMPERATURE: 98 F | HEART RATE: 62 BPM

## 2019-03-20 VITALS
TEMPERATURE: 99 F | RESPIRATION RATE: 17 BRPM | SYSTOLIC BLOOD PRESSURE: 142 MMHG | DIASTOLIC BLOOD PRESSURE: 85 MMHG | OXYGEN SATURATION: 98 % | HEART RATE: 74 BPM

## 2019-03-20 DIAGNOSIS — Z90.710 ACQUIRED ABSENCE OF BOTH CERVIX AND UTERUS: Chronic | ICD-10-CM

## 2019-03-20 PROBLEM — I10 ESSENTIAL (PRIMARY) HYPERTENSION: Chronic | Status: ACTIVE | Noted: 2017-02-02

## 2019-03-20 PROBLEM — E03.9 HYPOTHYROIDISM, UNSPECIFIED: Chronic | Status: ACTIVE | Noted: 2017-02-02

## 2019-03-20 PROBLEM — E11.9 TYPE 2 DIABETES MELLITUS WITHOUT COMPLICATIONS: Chronic | Status: ACTIVE | Noted: 2017-02-02

## 2019-03-20 LAB
ALBUMIN SERPL ELPH-MCNC: 4.5 G/DL — SIGNIFICANT CHANGE UP (ref 3.3–5)
ALP SERPL-CCNC: 86 U/L — SIGNIFICANT CHANGE UP (ref 40–120)
ALT FLD-CCNC: 41 U/L — HIGH (ref 4–33)
ANION GAP SERPL CALC-SCNC: 13 MMO/L — SIGNIFICANT CHANGE UP (ref 7–14)
APTT BLD: 30.3 SEC — SIGNIFICANT CHANGE UP (ref 27.5–36.3)
AST SERPL-CCNC: 29 U/L — SIGNIFICANT CHANGE UP (ref 4–32)
BASE EXCESS BLDV CALC-SCNC: 2.9 MMOL/L — SIGNIFICANT CHANGE UP
BASOPHILS # BLD AUTO: 0.05 K/UL — SIGNIFICANT CHANGE UP (ref 0–0.2)
BASOPHILS NFR BLD AUTO: 0.4 % — SIGNIFICANT CHANGE UP (ref 0–2)
BILIRUB SERPL-MCNC: 0.9 MG/DL — SIGNIFICANT CHANGE UP (ref 0.2–1.2)
BLOOD GAS VENOUS - CREATININE: SIGNIFICANT CHANGE UP MG/DL (ref 0.5–1.3)
BUN SERPL-MCNC: 6 MG/DL — LOW (ref 7–23)
CALCIUM SERPL-MCNC: 9.6 MG/DL — SIGNIFICANT CHANGE UP (ref 8.4–10.5)
CHLORIDE BLDV-SCNC: 94 MMOL/L — LOW (ref 96–108)
CHLORIDE SERPL-SCNC: 92 MMOL/L — LOW (ref 98–107)
CO2 SERPL-SCNC: 25 MMOL/L — SIGNIFICANT CHANGE UP (ref 22–31)
CREAT SERPL-MCNC: 0.7 MG/DL — SIGNIFICANT CHANGE UP (ref 0.5–1.3)
EOSINOPHIL # BLD AUTO: 0.07 K/UL — SIGNIFICANT CHANGE UP (ref 0–0.5)
EOSINOPHIL NFR BLD AUTO: 0.6 % — SIGNIFICANT CHANGE UP (ref 0–6)
GAS PNL BLDV: 130 MMOL/L — LOW (ref 136–146)
GLUCOSE BLDV-MCNC: 115 — HIGH (ref 70–99)
GLUCOSE SERPL-MCNC: 109 MG/DL — HIGH (ref 70–99)
HCO3 BLDV-SCNC: 26 MMOL/L — SIGNIFICANT CHANGE UP (ref 20–27)
HCT VFR BLD CALC: 41.2 % — SIGNIFICANT CHANGE UP (ref 34.5–45)
HCT VFR BLDV CALC: 45.5 % — HIGH (ref 34.5–45)
HGB BLD-MCNC: 14.8 G/DL — SIGNIFICANT CHANGE UP (ref 11.5–15.5)
HGB BLDV-MCNC: 14.8 G/DL — SIGNIFICANT CHANGE UP (ref 11.5–15.5)
IMM GRANULOCYTES NFR BLD AUTO: 0.5 % — SIGNIFICANT CHANGE UP (ref 0–1.5)
INR BLD: 1.05 — SIGNIFICANT CHANGE UP (ref 0.88–1.17)
LACTATE BLDV-MCNC: 1.8 MMOL/L — SIGNIFICANT CHANGE UP (ref 0.5–2)
LIDOCAIN IGE QN: 57.3 U/L — SIGNIFICANT CHANGE UP (ref 7–60)
LYMPHOCYTES # BLD AUTO: 2.75 K/UL — SIGNIFICANT CHANGE UP (ref 1–3.3)
LYMPHOCYTES # BLD AUTO: 22.4 % — SIGNIFICANT CHANGE UP (ref 13–44)
MCHC RBC-ENTMCNC: 29.2 PG — SIGNIFICANT CHANGE UP (ref 27–34)
MCHC RBC-ENTMCNC: 35.9 % — SIGNIFICANT CHANGE UP (ref 32–36)
MCV RBC AUTO: 81.3 FL — SIGNIFICANT CHANGE UP (ref 80–100)
MONOCYTES # BLD AUTO: 0.82 K/UL — SIGNIFICANT CHANGE UP (ref 0–0.9)
MONOCYTES NFR BLD AUTO: 6.7 % — SIGNIFICANT CHANGE UP (ref 2–14)
NEUTROPHILS # BLD AUTO: 8.55 K/UL — HIGH (ref 1.8–7.4)
NEUTROPHILS NFR BLD AUTO: 69.4 % — SIGNIFICANT CHANGE UP (ref 43–77)
NRBC # FLD: 0 K/UL — LOW (ref 25–125)
PCO2 BLDV: 44 MMHG — SIGNIFICANT CHANGE UP (ref 41–51)
PH BLDV: 7.41 PH — SIGNIFICANT CHANGE UP (ref 7.32–7.43)
PLATELET # BLD AUTO: 286 K/UL — SIGNIFICANT CHANGE UP (ref 150–400)
PMV BLD: 10 FL — SIGNIFICANT CHANGE UP (ref 7–13)
PO2 BLDV: 41 MMHG — HIGH (ref 35–40)
POTASSIUM BLDV-SCNC: 3.3 MMOL/L — LOW (ref 3.4–4.5)
POTASSIUM SERPL-MCNC: 3.4 MMOL/L — LOW (ref 3.5–5.3)
POTASSIUM SERPL-SCNC: 3.4 MMOL/L — LOW (ref 3.5–5.3)
PROT SERPL-MCNC: 8.3 G/DL — SIGNIFICANT CHANGE UP (ref 6–8.3)
PROTHROM AB SERPL-ACNC: 12 SEC — SIGNIFICANT CHANGE UP (ref 9.8–13.1)
RBC # BLD: 5.07 M/UL — SIGNIFICANT CHANGE UP (ref 3.8–5.2)
RBC # FLD: 12 % — SIGNIFICANT CHANGE UP (ref 10.3–14.5)
SAO2 % BLDV: 73.6 % — SIGNIFICANT CHANGE UP (ref 60–85)
SODIUM SERPL-SCNC: 130 MMOL/L — LOW (ref 135–145)
WBC # BLD: 12.3 K/UL — HIGH (ref 3.8–10.5)
WBC # FLD AUTO: 12.3 K/UL — HIGH (ref 3.8–10.5)

## 2019-03-20 PROCEDURE — 76705 ECHO EXAM OF ABDOMEN: CPT | Mod: 26

## 2019-03-20 PROCEDURE — 71046 X-RAY EXAM CHEST 2 VIEWS: CPT | Mod: 26

## 2019-03-20 PROCEDURE — 70450 CT HEAD/BRAIN W/O DYE: CPT | Mod: 26

## 2019-03-20 PROCEDURE — 71045 X-RAY EXAM CHEST 1 VIEW: CPT | Mod: 26

## 2019-03-20 PROCEDURE — 74177 CT ABD & PELVIS W/CONTRAST: CPT | Mod: 26

## 2019-03-20 PROCEDURE — 99284 EMERGENCY DEPT VISIT MOD MDM: CPT

## 2019-03-20 RX ORDER — ONDANSETRON 8 MG/1
4 TABLET, FILM COATED ORAL ONCE
Qty: 0 | Refills: 0 | Status: COMPLETED | OUTPATIENT
Start: 2019-03-20 | End: 2019-03-20

## 2019-03-20 RX ORDER — SODIUM CHLORIDE 9 MG/ML
1000 INJECTION INTRAMUSCULAR; INTRAVENOUS; SUBCUTANEOUS ONCE
Qty: 0 | Refills: 0 | Status: COMPLETED | OUTPATIENT
Start: 2019-03-20 | End: 2019-03-20

## 2019-03-20 RX ORDER — KETOROLAC TROMETHAMINE 30 MG/ML
15 SYRINGE (ML) INJECTION ONCE
Qty: 0 | Refills: 0 | Status: DISCONTINUED | OUTPATIENT
Start: 2019-03-20 | End: 2019-03-20

## 2019-03-20 RX ORDER — LIDOCAINE 4 G/100G
10 CREAM TOPICAL ONCE
Qty: 0 | Refills: 0 | Status: COMPLETED | OUTPATIENT
Start: 2019-03-20 | End: 2019-03-20

## 2019-03-20 RX ORDER — ACETAMINOPHEN 500 MG
975 TABLET ORAL ONCE
Qty: 0 | Refills: 0 | Status: COMPLETED | OUTPATIENT
Start: 2019-03-20 | End: 2019-03-20

## 2019-03-20 RX ORDER — METOCLOPRAMIDE HCL 10 MG
10 TABLET ORAL ONCE
Qty: 0 | Refills: 0 | Status: COMPLETED | OUTPATIENT
Start: 2019-03-20 | End: 2019-03-20

## 2019-03-20 RX ORDER — FAMOTIDINE 10 MG/ML
20 INJECTION INTRAVENOUS DAILY
Qty: 0 | Refills: 0 | Status: DISCONTINUED | OUTPATIENT
Start: 2019-03-20 | End: 2019-03-24

## 2019-03-20 RX ADMIN — Medication 975 MILLIGRAM(S): at 17:15

## 2019-03-20 RX ADMIN — LIDOCAINE 10 MILLILITER(S): 4 CREAM TOPICAL at 17:02

## 2019-03-20 RX ADMIN — ONDANSETRON 4 MILLIGRAM(S): 8 TABLET, FILM COATED ORAL at 17:04

## 2019-03-20 RX ADMIN — ONDANSETRON 4 MILLIGRAM(S): 8 TABLET, FILM COATED ORAL at 19:48

## 2019-03-20 RX ADMIN — Medication 15 MILLIGRAM(S): at 21:45

## 2019-03-20 RX ADMIN — Medication 30 MILLILITER(S): at 17:02

## 2019-03-20 RX ADMIN — Medication 10 MILLIGRAM(S): at 22:56

## 2019-03-20 RX ADMIN — Medication 15 MILLIGRAM(S): at 20:50

## 2019-03-20 RX ADMIN — SODIUM CHLORIDE 1000 MILLILITER(S): 9 INJECTION INTRAMUSCULAR; INTRAVENOUS; SUBCUTANEOUS at 17:03

## 2019-03-20 RX ADMIN — Medication 975 MILLIGRAM(S): at 19:49

## 2019-03-20 RX ADMIN — FAMOTIDINE 20 MILLIGRAM(S): 10 INJECTION INTRAVENOUS at 17:15

## 2019-03-20 NOTE — ED PROVIDER NOTE - OBJECTIVE STATEMENT
73F hx of HTN, DM, and hypothyroidism p/w abdominal pain x 5 days. Pain is epigastric and RUQ, dull, moderate, usually worse 1 hour after eating food. Associated with nausea and mild non-bloody diarrhea. Denies any fever, chills, vomiting, chest pain, SOB, or dysuria. Denies any history of abdominal surgery aside from hysterectomy. Denies any previous episodes of similar symptoms.

## 2019-03-20 NOTE — ED PROVIDER NOTE - PROGRESS NOTE DETAILS
Pt complaining of headache here, now adds that she has had it for several weeks and was sent to PT by her PMD, but never had any testing, no meningeal signs, will CT head. Complete resolution of all symptoms after medication. Repeat abdominal exam soft, nontender, with no rebound or surgical signs. Patient is tolerating oral fluid and solid challenge without difficulty, nausea, pain or vomiting. Patient to be discharged home with close outpatient followup. Discharge plan discussed with patient at length and patient voices understanding.

## 2019-03-20 NOTE — ED PROVIDER NOTE - CLINICAL SUMMARY MEDICAL DECISION MAKING FREE TEXT BOX
Post-prandial RUQ/epigastric pain x 5 days. Pt well-appearing with normal vitals. No concern for systemic infection. No guarding or rebound. Will assess for biliary pathology and pancreatitis with labs and RUQ US. GI cocktail/IVFs and reassess.

## 2019-03-20 NOTE — ED ADULT NURSE NOTE - OBJECTIVE STATEMENT
Pt presents to rm 9, A&Ox4, ambulatory w/o assistance, here for evaluation of abd pain, headache & nausea x5days. Denies chest pain, shortness of breath, palpitations, diaphoresis, fevers, dizziness, nausea, vomiting, diarrhea, or urinary symptoms at this time. IV established in left AC with a 20G, labs drawn and sent, call bell in reach, warm blanket provided, bed in lowest position, side rails up x2. Will continue to monitor.

## 2019-03-20 NOTE — ED PROVIDER NOTE - ATTENDING CONTRIBUTION TO CARE
73 year old female with a history of htn, hypothyroidism came to the ED because of nausea and upper abd pain. The symptoms started after she ate fried fish, cabbage and rice on Friday and have not passed. no fever, no chills, no chest pain, no sob, no vomiting, no rash, no urinary complaints. She reports diarrhea today. On exam, RUQ tenderness, no rebound, no guarding, no pulsatile mass, equal femoral pulses, lungs cta, rrr, no edema, no calf tenderness. US, labs, medication, observation and reassessment.

## 2019-03-20 NOTE — ED PROVIDER NOTE - PHYSICAL EXAMINATION
Gen: AAOx3, non-toxic  Head: NCAT  HEENT: EOMI, oral mucosa moist, normal conjunctiva  Lung: CTAB, no respiratory distress, no wheezes/rhonchi/rales B/L, speaking in full sentences  CV: RRR, no murmurs, rubs or gallops  Abd: soft, RUQ TTP, +cisneros's, no guarding or rebound, no CVA tenderness  MSK: no visible deformities  Neuro: No focal sensory or motor deficits  Skin: Warm, well perfused, no rash  Psych: normal affect.     ~Jayro Garrett PGY1

## 2019-03-20 NOTE — ED PROVIDER NOTE - NS ED ROS FT
GENERAL: No fever or chills  EYES: no change in vision  HEENT: no trouble swallowing or speaking  CARDIAC: no chest pain  PULMONARY: no cough or SOB  GI: +abd pain, nausea, and diarrhea. no vomiting or constipation  : No changes in urination  SKIN: no rashes  NEURO: no headache or neuro sxs  MSK: No joint pain     ~Jayro Garrett PGY1

## 2019-03-21 RX ORDER — PANTOPRAZOLE SODIUM 20 MG/1
1 TABLET, DELAYED RELEASE ORAL
Qty: 30 | Refills: 0 | OUTPATIENT
Start: 2019-03-21 | End: 2019-04-19

## 2019-03-21 RX ORDER — METOCLOPRAMIDE HCL 10 MG
1 TABLET ORAL
Qty: 20 | Refills: 0 | OUTPATIENT
Start: 2019-03-21 | End: 2019-03-30

## 2019-03-21 NOTE — ED ADULT NURSE REASSESSMENT NOTE - NS ED NURSE REASSESS COMMENT FT1
Pt in NAD, awaiting CT scan to return. will continue to monitor.
Pt in NAD, pt medicated as per ordered, will continue to monitor.
pt endorsing partial relief from pain medication. will continue to monitor.

## 2019-07-28 NOTE — DISCHARGE NOTE ADULT - MEDICATION SUMMARY - MEDICATIONS TO TAKE
- last EGD 2016   - continue home pantoprazole 40 mg I will START or STAY ON the medications listed below when I get home from the hospital:    aspirin 81 mg oral delayed release tablet  -- 1 tab(s) by mouth once a day  -- Indication: For cad    atorvastatin 20 mg oral tablet  -- 1 tab(s) by mouth once a day (at bedtime)  -- Indication: For cad    metoprolol tartrate 100 mg oral tablet  -- 1 tab(s) by mouth once a day  -- Indication: For HTN (hypertension)    amLODIPine 5 mg oral tablet  -- 1 tab(s) by mouth once a day  -- Indication: For HTN (hypertension)    levothyroxine 75 mcg (0.075 mg) oral tablet  -- 1 tab(s) by mouth once a day  -- Recent dose change from 50mcg as per Pharmacy  -- Indication: For Hypothyroidism    multivitamin  -- 1 tab(s) by mouth once a day  -- Indication: For vitamin    Calcium 600+D 600 mg-200 intl units oral tablet  -- 1 tab(s) by mouth 3 times a day  -- Indication: For caclium defiency

## 2024-08-21 NOTE — PATIENT PROFILE ADULT. - FUNCTIONAL SCREEN CURRENT LEVEL: DRESSING, MLM
1430 Pt arrived to LDR 7 for scheduled cerclage.     1531 Report given to IDRIS Holloway RN  
1530-Bedside and Verbal shift change report given to IDRIS Holloway RN  (oncoming nurse) by IDRIS Navas RN  (offgoing nurse). Report included the following information Nurse Handoff Report.    1547-Dr Basurto at bedside. Discussing POC with patient.   1600-Pt up to bathroom to void.   1607-Pt ambulated to OR 1 for scheduled cerclage by Dr Basurto. No complaints voiced.   1656-Pt out of OR via stretcher.  CRNA present. Pt voices no complaints.  Per Dr Basurto, ok for patient to be discharged after eating, voiding and ambulating.  Pt knows when to follow up with Dr Basurto.  Will check FHTs prior to discharge per Dr Basurto.   1815-Pt sitting up to eat.  No complaints voiced. IV saline locked.   1900-Pt sitting up in bed, able to move legs \"side to side\".  Denies urge to void at this time.   1920-Pt up to bathroom with minimal assist.  Sat on toilet x 5 minutes.  Not able to void at this time.  Assisted back to bed. No vaginal bleeding noted.  Pt denies any pain.   1945-Bedside and Verbal shift change report given to JULIA Tidwell RN  (oncoming nurse) by YADIEL Holloway RN  (offgoing nurse). Report included the following information Nurse Handoff Report.    
1945 Report received from IDRIS Holloway Rn    2000 resting without complaint still unable to pee at this time.    2110 up to bathroom able to void.    2115 Discharged in stable condition with belongings with family via wheel chair. Discharge papers reviewed and understand instructions.      
(0) independent